# Patient Record
Sex: FEMALE | Race: WHITE | HISPANIC OR LATINO
[De-identification: names, ages, dates, MRNs, and addresses within clinical notes are randomized per-mention and may not be internally consistent; named-entity substitution may affect disease eponyms.]

---

## 2020-09-02 ENCOUNTER — TRANSCRIPTION ENCOUNTER (OUTPATIENT)
Age: 30
End: 2020-09-02

## 2021-11-16 PROBLEM — Z00.00 ENCOUNTER FOR PREVENTIVE HEALTH EXAMINATION: Status: ACTIVE | Noted: 2021-11-16

## 2021-11-21 ENCOUNTER — NON-APPOINTMENT (OUTPATIENT)
Age: 31
End: 2021-11-21

## 2021-11-22 ENCOUNTER — TRANSCRIPTION ENCOUNTER (OUTPATIENT)
Age: 31
End: 2021-11-22

## 2021-11-22 ENCOUNTER — APPOINTMENT (OUTPATIENT)
Dept: RHEUMATOLOGY | Facility: CLINIC | Age: 31
End: 2021-11-22
Payer: MEDICAID

## 2021-11-22 VITALS
TEMPERATURE: 98 F | WEIGHT: 145 LBS | OXYGEN SATURATION: 98 % | SYSTOLIC BLOOD PRESSURE: 120 MMHG | HEIGHT: 59 IN | DIASTOLIC BLOOD PRESSURE: 82 MMHG | HEART RATE: 85 BPM | BODY MASS INDEX: 29.23 KG/M2

## 2021-11-22 DIAGNOSIS — Z78.9 OTHER SPECIFIED HEALTH STATUS: ICD-10-CM

## 2021-11-22 DIAGNOSIS — Z82.49 FAMILY HISTORY OF ISCHEMIC HEART DISEASE AND OTHER DISEASES OF THE CIRCULATORY SYSTEM: ICD-10-CM

## 2021-11-22 DIAGNOSIS — R76.8 OTHER SPECIFIED ABNORMAL IMMUNOLOGICAL FINDINGS IN SERUM: ICD-10-CM

## 2021-11-22 DIAGNOSIS — M65.4 RADIAL STYLOID TENOSYNOVITIS [DE QUERVAIN]: ICD-10-CM

## 2021-11-22 DIAGNOSIS — Z80.3 FAMILY HISTORY OF MALIGNANT NEOPLASM OF BREAST: ICD-10-CM

## 2021-11-22 DIAGNOSIS — Z83.3 FAMILY HISTORY OF DIABETES MELLITUS: ICD-10-CM

## 2021-11-22 PROCEDURE — 99204 OFFICE O/P NEW MOD 45 MIN: CPT

## 2021-11-22 RX ORDER — NABUMETONE 750 MG/1
750 TABLET, FILM COATED ORAL
Qty: 60 | Refills: 2 | Status: ACTIVE | COMMUNITY
Start: 2021-11-22 | End: 1900-01-01

## 2021-11-22 NOTE — ASSESSMENT
[FreeTextEntry1] : 31 year old female who presents for evaluation of hand pain.\par \par 1. De Quervain tenosynovitis\par -Wrist x-ray's reviewed and negative\par -Start nabumetone 750 BID PRN \par -Start thumb spica brace \par -If no improvement with conservative therapy she may need steroid injection\par \par 2. Carpal tunnel syndrome\par -Start wrist stabilizer at night\par -Nabumetone as above\par -If she fails conservative trial may need steroid injection\par \par 3. Positive ERICA\par -Patient reports ERICA+ when seeing hematology and question of SLE was brought up\par -Complete SLE labs. She has hand pains likely due to above, but otherwise no clear signs or symptoms of SLE today\par \par F/u 3 months

## 2021-11-22 NOTE — PHYSICAL EXAM
[General Appearance - Alert] : alert [General Appearance - In No Acute Distress] : in no acute distress [Sclera] : the sclera and conjunctiva were normal [PERRL With Normal Accommodation] : pupils were equal in size, round, and reactive to light [Extraocular Movements] : extraocular movements were intact [Outer Ear] : the ears and nose were normal in appearance [Oropharynx] : the oropharynx was normal [Neck Appearance] : the appearance of the neck was normal [Neck Cervical Mass (___cm)] : no neck mass was observed [Jugular Venous Distention Increased] : there was no jugular-venous distention [Thyroid Diffuse Enlargement] : the thyroid was not enlarged [Thyroid Nodule] : there were no palpable thyroid nodules [Auscultation Breath Sounds / Voice Sounds] : lungs were clear to auscultation bilaterally [Heart Rate And Rhythm] : heart rate was normal and rhythm regular [Heart Sounds] : normal S1 and S2 [Heart Sounds Gallop] : no gallops [Murmurs] : no murmurs [Heart Sounds Pericardial Friction Rub] : no pericardial rub [Bowel Sounds] : normal bowel sounds [Abdomen Soft] : soft [Abdomen Tenderness] : non-tender [Abdomen Mass (___ Cm)] : no abdominal mass palpated [Abnormal Walk] : normal gait [Nail Clubbing] : no clubbing  or cyanosis of the fingernails [Involuntary Movements] : no involuntary movements were seen [Musculoskeletal - Swelling] : no joint swelling seen [Motor Tone] : muscle strength and tone were normal [] : no rash [FreeTextEntry1] : Negative tinel, positive phalen bilaterally [Affect] : the affect was normal [Mood] : the mood was normal

## 2021-11-22 NOTE — HISTORY OF PRESENT ILLNESS
[FreeTextEntry1] : 31 year old female who presents for evaluation of hand pain.\par \par Patient reports after delivering her child 7 months ago she has pain in her hands left worse than right. Pain is noticed at rest, when using her hand. Denies AM stiffness or swelling. She doesn’t take OTC pain relief medication. Hands are numb when she gets up in the AM. She went to see hematology for anemia and had a positive ERICA (no record in EMR). She denies fatigue, rash, arthralgias.

## 2022-03-02 ENCOUNTER — APPOINTMENT (OUTPATIENT)
Dept: RHEUMATOLOGY | Facility: CLINIC | Age: 32
End: 2022-03-02

## 2022-10-13 ENCOUNTER — EMERGENCY (EMERGENCY)
Facility: HOSPITAL | Age: 32
LOS: 0 days | Discharge: HOME | End: 2022-10-13
Attending: STUDENT IN AN ORGANIZED HEALTH CARE EDUCATION/TRAINING PROGRAM

## 2022-10-13 ENCOUNTER — EMERGENCY (EMERGENCY)
Facility: HOSPITAL | Age: 32
LOS: 0 days | Discharge: HOME | End: 2022-10-13
Attending: EMERGENCY MEDICINE | Admitting: EMERGENCY MEDICINE

## 2022-10-13 VITALS
TEMPERATURE: 98 F | WEIGHT: 126.99 LBS | SYSTOLIC BLOOD PRESSURE: 116 MMHG | HEART RATE: 72 BPM | OXYGEN SATURATION: 99 % | DIASTOLIC BLOOD PRESSURE: 73 MMHG | RESPIRATION RATE: 18 BRPM

## 2022-10-13 VITALS
SYSTOLIC BLOOD PRESSURE: 116 MMHG | HEART RATE: 66 BPM | TEMPERATURE: 98 F | WEIGHT: 126.99 LBS | RESPIRATION RATE: 18 BRPM | OXYGEN SATURATION: 100 % | DIASTOLIC BLOOD PRESSURE: 86 MMHG

## 2022-10-13 DIAGNOSIS — S01.111A LACERATION WITHOUT FOREIGN BODY OF RIGHT EYELID AND PERIOCULAR AREA, INITIAL ENCOUNTER: ICD-10-CM

## 2022-10-13 DIAGNOSIS — R51.9 HEADACHE, UNSPECIFIED: ICD-10-CM
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
## 2022-10-13 PROCEDURE — 99283 EMERGENCY DEPT VISIT LOW MDM: CPT | Mod: 25

## 2022-10-13 PROCEDURE — 12011 RPR F/E/E/N/L/M 2.5 CM/<: CPT

## 2022-10-13 PROCEDURE — 99283 EMERGENCY DEPT VISIT LOW MDM: CPT

## 2022-10-13 NOTE — ED PROVIDER NOTE - CARE PLAN
Principal Discharge DX:	Laceration of eyebrow  Assessment and plan of treatment:	plan- lac repair, dc   1

## 2022-10-13 NOTE — ED ADULT NURSE NOTE - OBJECTIVE STATEMENT
pt came in c/o fall in the shower and a laceration to the R eyebrow. no loc. no blood thinners . no s.s of acute distress.

## 2022-10-13 NOTE — ED PROVIDER NOTE - PATIENT PORTAL LINK FT
You can access the FollowMyHealth Patient Portal offered by [facility] by registering at the following website: [email or web address]. By joining [facility]’s FollowMyHealth portal, you will also be able to view your health information using other applications (apps) compatible with our system.

## 2022-10-13 NOTE — ED PROVIDER NOTE - NS ED ROS FT
No fever, nausea, vomiting, chest pain, sob, palpitations, diaphoresis, cough, headache, dizziness, numbness/tingling, neck pain, abdominal pain, diarrhea, constipation, weakness, blurry vision.

## 2022-10-13 NOTE — ED PROVIDER NOTE - ATTENDING APP SHARED VISIT CONTRIBUTION OF CARE
31 yo f with no pmh, seen earlier today for mechanical head injury with R eyebrow lac.  pt was repaired.  pt says she noticed one of the sutures was slightly lose and the wound was bleeding a little.  bleeding controlled now.  exam: R eyebrow lac, sutures in place, no active bleeding imp: pt with recent lac repair, will steristrip, return in 5 days for removal

## 2022-10-13 NOTE — ED PROVIDER NOTE - NSFOLLOWUPINSTRUCTIONS_ED_ALL_ED_FT
PLEASE FOLLOW UP in ED OR WITH PCP IN 3-5 DAYS FOR SUTURE REMOVAL OF 4 SUTURES.       Laceration    A laceration is a cut that goes through all of the layers of the skin and into the tissue that is right under the skin. Some lacerations heal on their own. Others need to be closed with stitches (sutures), staples, skin adhesive strips, or skin glue. Proper laceration care minimizes the risk of infection and helps the laceration to heal better.     SEEK IMMEDIATE MEDICAL CARE IF YOU HAVE THE FOLLOWING SYMPTOMS: swelling around the wound, worsening pain, drainage from the wound, red streaking going away from your wound, inability to move finger or toe near the laceration, or discoloration of skin near the laceration.

## 2022-10-13 NOTE — ED PROVIDER NOTE - CLINICAL SUMMARY MEDICAL DECISION MAKING FREE TEXT BOX
31 yo F presented w/ R eyebrow lac. UTD tetanus. NV intact. Lac repaired w/ 4 nonabsorbable sutures.     Patient n/v intact with Full ROM and full motor strength with no signs of any serious injury. No signs of tendon injury on direct examination. Tetanus up-to-date.  Laceration repaired as described.  Wound care discussed in detail. Signs of infection discussed. Timing and return for suture removal discussed. Medications administered and prescribed/OTC home meds discussed.  All questions and concerns from patient or family addressed. Understanding of instructions verbalized.     Patient to be discharged from ED. Any available test results were discussed with patient and/or family. Verbal instructions given, including instructions to return to ED immediately for any new, worsening, or concerning symptoms. Patient endorsed understanding. Written discharge instructions additionally given, including follow-up plan.

## 2022-10-13 NOTE — ED PROVIDER NOTE - PHYSICAL EXAMINATION
CONSTITUTIONAL: NAD.   SKIN: warm, dry  HEAD: Normocephalic; atraumatic.  EYES: no conjunctival injection. PERRLA. EOMI. +1.5cm linear laceration to R eyebrow.   ENT: MMM.   NECK: Supple.  CARD: RRR.   RESP: No accessory muscle use.   ABD: soft ntnd.   EXT: Normal ROM.  No lower extremity edema.   NEURO: AOx3, CN 2-12 grossly intact. +5/5 strength and sensation wnl in all extremities. No pronator drift, no dysarthria, no ataxia.  PSYCH: Cooperative, appropriate.

## 2022-10-13 NOTE — ED ADULT TRIAGE NOTE - CHIEF COMPLAINT QUOTE
Patient walk into ED a+ox4 reports she was seen in ED this am sp trip and fall in bathtub hit head with laceration, no LOC or AC use. 4 stiches were placed- pt reports one stitch is loose and requesting CT dt headache (pt states refused CT this am).

## 2022-10-13 NOTE — ED PROVIDER NOTE - OBJECTIVE STATEMENT
33 y/o F no reported PMHx presents to ED s/p mechanical trip and fall this morning in the shower, +HT to R head. No LOC. No vomiting. Pt sustained laceration to R eyebrow. UTD tetanus.

## 2022-10-13 NOTE — ED PROVIDER NOTE - OBJECTIVE STATEMENT
31 y/o F, no significant PMHx, presents to the ED stating that one of her sutures appears to be loose. Patient was evaluated in the ED this AM following a mechanical fall in her bathroom. She denies any LOC however admits to a right sided headache of gradual onset; denies nausea, vomiting, neck pain and additional injuries. She took Ibuprofen this AM with some symptomatic improvement.

## 2022-10-13 NOTE — ED PROVIDER NOTE - PATIENT PORTAL LINK FT
You can access the FollowMyHealth Patient Portal offered by [facility] by registering at the following website: [email or web address]. By joining [facility]’s FollowMyHealth portal, you will also be able to view your health information using other applications (apps) compatible with our system.

## 2022-10-13 NOTE — ED PROVIDER NOTE - SKIN, MLM
4 sutures intact along right eyebrow ; no active bleeding; remainder of visualized skin normal color for race, warm, dry and intact. No evidence of rash.

## 2022-10-13 NOTE — ED PROVIDER NOTE - CARE PROVIDER_API CALL
RETURN TO THE ED IN 4 DAYS FOR SUTURE REMOVAL,   Phone: (   )    -  Fax: (   )    -  Follow Up Time:

## 2022-10-13 NOTE — ED PROVIDER NOTE - NS ED ATTENDING STATEMENT MOD
This was a shared visit with the DEX. I reviewed and verified the documentation and independently performed the documented:

## 2022-10-18 ENCOUNTER — EMERGENCY (EMERGENCY)
Facility: HOSPITAL | Age: 32
LOS: 0 days | Discharge: HOME | End: 2022-10-18
Attending: EMERGENCY MEDICINE | Admitting: EMERGENCY MEDICINE

## 2022-10-18 VITALS
HEART RATE: 70 BPM | WEIGHT: 126.99 LBS | RESPIRATION RATE: 18 BRPM | SYSTOLIC BLOOD PRESSURE: 131 MMHG | DIASTOLIC BLOOD PRESSURE: 85 MMHG | TEMPERATURE: 98 F | OXYGEN SATURATION: 99 %

## 2022-10-18 DIAGNOSIS — S01.111D LACERATION WITHOUT FOREIGN BODY OF RIGHT EYELID AND PERIOCULAR AREA, SUBSEQUENT ENCOUNTER: ICD-10-CM
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
## 2022-10-18 PROCEDURE — L9995: CPT

## 2022-10-18 NOTE — ED PROVIDER NOTE - PHYSICAL EXAMINATION
Physical Exam    Constitutional: No acute distress.   Head: Right eyebrow wound healing well without surrounding erythema or pus discharge. 4 sutures in place.   ENT: No sinus tenderness. No nasal discharge. No oropharyngeal erythema, edema, or exudates. Uvula midline.   Cardiovascular: Regular rate, regular rhythm. No noted murmurs rubs or gallops.  Respiratory: unlabored respiratory effort, clear to auscultation bilaterally no wheezing, rales or rhonchi  Gastrointestinal: Normal bowel sounds. soft, non distended, non-tender abdomen.   Musculoskeletal: supple neck, no midline tenderness. No joint or bony deformity.   Integumentary: warm, dry, no rash  Neurologic: awake, alert, cranial nerves II-XII grossly intact, extremities’ motor and sensory functions grossly intact  Psychiatric: appropriate mood, appropriate affect Physical Exam    Constitutional: No acute distress.   Head: Right eyebrow wound healing well without surrounding erythema or pus discharge. 4 sutures in place.   ENT: No sinus tenderness. No nasal discharge. No oropharyngeal erythema, edema, or exudates. Uvula midline.   Cardiovascular: Regular rate, regular rhythm  Respiratory: unlabored respiratory effort  Gastrointestinal: soft, non distended, non-tender abdomen.   Musculoskeletal:  No joint or bony deformity.   Integumentary: warm, dry  Neurologic: awake, alert, oriented  Psychiatric: appropriate mood, appropriate affect

## 2022-10-18 NOTE — ED PROVIDER NOTE - OBJECTIVE STATEMENT
32 year old female presents for suture removal. Patient had 4 sutures placed to right forehead/eyebrow laceration on 10/13. Wound is healing well without surrounding redness or pus discharge. 32 year old female presents for suture removal. Patient had 4 sutures placed to right forehead/eyebrow laceration on 10/13. Wound is healing well without surrounding redness or pus discharge. Denies pain and fevers.

## 2022-10-18 NOTE — ED PROVIDER NOTE - PATIENT PORTAL LINK FT
You can access the FollowMyHealth Patient Portal offered by [facility] by registering at the following website: [email or web address]. By joining [employer]’s FollowMyHealth portal, you will also be able to view your health information using other applications (apps) compatible with our system.

## 2022-10-18 NOTE — ED PROVIDER NOTE - NS ED ROS FT
Constitutional: (-) fever  Eyes/ENT: (-) blurry vision, (-) epistaxis (+) healing right forehead/eyebrow laceration  Cardiovascular: (-) chest pain, (-) syncope  Respiratory: (-) cough, (-) shortness of breath  Gastrointestinal: (-) vomiting, (-) diarrhea  Musculoskeletal: (-) neck pain, (-) back pain, (-) joint pain  Integumentary: (-) rash, (-) edema  Neurological: (-) headache, (-) altered mental status  Psychiatric: (-) hallucinations  Allergic/Immunologic: (-) pruritus

## 2022-10-18 NOTE — ED PROVIDER NOTE - CLINICAL SUMMARY MEDICAL DECISION MAKING FREE TEXT BOX
32-year-old female presents for a 6-year-old from above right forehead.  Sutures placed on 10/13.  Wound healing well, sutures removed without any complications.  Recommend continued use of petroleum jelly/bacitracin to help improve smoothness of skin.  No other acute intervention

## 2022-10-18 NOTE — ED PROCEDURE NOTE - ATTENDING APP SHARED VISIT CONTRIBUTION OF CARE
I was present for and supervised the key/critical aspects of the procedures performed during the care of the patient.--suture removal
